# Patient Record
Sex: FEMALE | Race: BLACK OR AFRICAN AMERICAN | NOT HISPANIC OR LATINO | URBAN - NONMETROPOLITAN AREA
[De-identification: names, ages, dates, MRNs, and addresses within clinical notes are randomized per-mention and may not be internally consistent; named-entity substitution may affect disease eponyms.]

---

## 2020-07-24 ENCOUNTER — IMPORTED ENCOUNTER (OUTPATIENT)
Dept: URBAN - NONMETROPOLITAN AREA CLINIC 1 | Facility: CLINIC | Age: 65
End: 2020-07-24

## 2020-07-24 PROBLEM — H25.813: Noted: 2018-03-09

## 2020-07-24 PROBLEM — H10.423: Noted: 2020-07-24

## 2020-07-24 PROBLEM — H40.013: Noted: 2020-07-24

## 2020-07-24 PROBLEM — H40.1131: Noted: 2021-03-22

## 2020-07-24 PROBLEM — H52.4: Noted: 2018-03-09

## 2020-07-24 PROCEDURE — 92014 COMPRE OPH EXAM EST PT 1/>: CPT

## 2020-07-24 PROCEDURE — 92015 DETERMINE REFRACTIVE STATE: CPT

## 2020-07-24 NOTE — PATIENT DISCUSSION
Presbyopia OUDiscussed refractive status in detail with patient. New glasses Rx given today. Continue to monitor. 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 Reviewed symptoms related to cataract progression. Discussed various treatment options with patient. No treatment required at this time. Continue to monitor. Ocular Allergies OUDiscussed diagnosis in detail with patient. Papillae noted OD>OS. Start Pataday BID OU X 2 week then PRN. Continue to monitor. Glaucoma Suspect OUDiscussed diagnosis in detail with patient. Positive family history of disease (father). IOP at 10 OD and 11 OS. Cup to disc noted at 0.6 OD and 0.7 OS. Continue to monitorRTC in 6 months with VF 24-2 OCT and Pach; 's Notes: MR  7/24/20DFE  7/24/20OCTOPTOSGONIOVFPACH

## 2021-03-22 ENCOUNTER — IMPORTED ENCOUNTER (OUTPATIENT)
Dept: URBAN - NONMETROPOLITAN AREA CLINIC 1 | Facility: CLINIC | Age: 66
End: 2021-03-22

## 2021-03-22 PROCEDURE — 99214 OFFICE O/P EST MOD 30 MIN: CPT

## 2021-03-22 PROCEDURE — 92083 EXTENDED VISUAL FIELD XM: CPT

## 2021-03-22 PROCEDURE — 76514 ECHO EXAM OF EYE THICKNESS: CPT

## 2021-03-22 PROCEDURE — 92133 CPTRZD OPH DX IMG PST SGM ON: CPT

## 2021-03-22 NOTE — PATIENT DISCUSSION
POAG OUDiscussed diagnosis in detail with patient. Positive family history of disease (father). VF done today; baseline with temp defect OD and non specific defect OS. OCT done today; baseline with superior/inferior NFL thinning OD and borderline temp NFL thinning OS. Pach done today; baseline with thin corneas 507 OD and 524 OS. IOP at 14 OU. Cup to disc noted at 0.6 OD and 0.7 OS. Start Vyzulta QHS OU sample given today and Rx sent to pharmacy. Continue to monitorRTC in 3 months for follow up with OptosCombined Cataract OU Discussed diagnosis in detail with patient. Reviewed symptoms related to cataract progression. Discussed various treatment options with patient. No treatment required at this time. Continue to monitor. Ocular Allergies OUDiscussed diagnosis in detail with patient. Papillae improved today. Continue Pataday PRN. Continue to monitor.; 's Notes: MR  7/24/20DFE  7/24/20OCT   3/22/21VF  3/22/21OPTOSGONIOPACH  3/22/21

## 2021-06-29 ENCOUNTER — IMPORTED ENCOUNTER (OUTPATIENT)
Dept: URBAN - NONMETROPOLITAN AREA CLINIC 1 | Facility: CLINIC | Age: 66
End: 2021-06-29

## 2021-06-29 PROCEDURE — 92250 FUNDUS PHOTOGRAPHY W/I&R: CPT

## 2021-06-29 PROCEDURE — 99214 OFFICE O/P EST MOD 30 MIN: CPT

## 2021-06-29 NOTE — PATIENT DISCUSSION
POAG OUDiscussed diagnosis in detail with patient. Positive family history of disease (father). VF done previously; baseline with temp defect OD and non specific defect OS. OCT done previously; baseline with superior/inferior NFL thinning OD and borderline temp NFL thinning OS. Pach done previously; baseline with thin corneas 507 OD and 524 OS. Optos done today; baseline with cupping noted. IOP noted at 10 OU today last visit was 14 OU. Cup to disc noted at 0.6 OD and 0.7 OS. Shannon Cabrera was not covered by insurance. Continue Latanoprost OU QHS. Target IOP below 15. Continue to monitor closelyRTC in 3 months for follow up with GonioCombined Cataract OU Discussed diagnosis in detail with patient. Reviewed symptoms related to cataract progression. Discussed various treatment options with patient. No treatment required at this time. Continue to monitor. Ocular Allergies OUDiscussed diagnosis in detail with patient. Papillae improved today. Continue Pataday PRN. Continue to monitor.; 's Notes: MR  7/24/20DFE  7/24/20OCT   3/22/21VF  3/22/21OPTOS  6/29/2021GONIOPACH  3/22/21

## 2021-10-01 ENCOUNTER — IMPORTED ENCOUNTER (OUTPATIENT)
Dept: URBAN - NONMETROPOLITAN AREA CLINIC 1 | Facility: CLINIC | Age: 66
End: 2021-10-01

## 2021-10-01 ENCOUNTER — PREPPED CHART (OUTPATIENT)
Dept: URBAN - NONMETROPOLITAN AREA CLINIC 1 | Facility: CLINIC | Age: 66
End: 2021-10-01

## 2021-10-01 PROCEDURE — 92020 GONIOSCOPY: CPT

## 2021-10-01 PROCEDURE — 99214 OFFICE O/P EST MOD 30 MIN: CPT

## 2021-10-01 NOTE — PATIENT DISCUSSION
POAG OUDiscussed diagnosis in detail with patient. Positive family history of disease (father). Discussed the chronic progressive nature of this disease and various treatment options. Importance of good compliance with medications was emphasized. Gonio done today; baseline; Grade IV with light pigment OU. VF done previously; baseline with temp defect OD and non specific defect OS. OCT done previously; baseline with superior/inferior NFL thinning OD and borderline temp NFL thinning OS. Pach done previously; baseline with thin corneas 507 OD and 524 OS. Optos done previously; baseline with cupping noted. IOP noted at 09 OD and 10 OS. Cup to disc noted at 0.6 OD and 0.7 OS. Ancil Devries was not covered by insurance. Continue Latanoprost OU QHS Rx sent to pharmacy. Target IOP below 15. Continue to monitor closelyRTC in 3 months for follow up with OCTCombined Cataract OU Discussed diagnosis in detail with patient. Reviewed symptoms related to cataract progression. Discussed various treatment options with patient. No treatment required at this time. Continue to monitor. Ocular Allergies OUDiscussed diagnosis in detail with patient. Papillae improved today. Continue Pataday PRN. Continue to monitor.; 's Notes: MR  7/24/20DFE  7/24/20OCT   3/22/21VF  3/22/21OPTOS  6/29/2021GONIO  10/1/21PACH  3/22/21

## 2022-03-04 ASSESSMENT — TONOMETRY
OD_IOP_MMHG: 09
OS_IOP_MMHG: 10

## 2022-03-04 ASSESSMENT — VISUAL ACUITY
OS_CC: 20/40+1
OS_PH: 20/30-1
OD_CC: 20/30+1

## 2022-03-07 ENCOUNTER — FOLLOW UP (OUTPATIENT)
Dept: URBAN - NONMETROPOLITAN AREA CLINIC 1 | Facility: CLINIC | Age: 67
End: 2022-03-07

## 2022-03-07 DIAGNOSIS — H40.1131: ICD-10-CM

## 2022-03-07 PROCEDURE — 99214 OFFICE O/P EST MOD 30 MIN: CPT

## 2022-03-07 PROCEDURE — 92133 CPTRZD OPH DX IMG PST SGM ON: CPT

## 2022-03-07 ASSESSMENT — VISUAL ACUITY
OS_CC: 20/30
OD_CC: 20/30-2

## 2022-03-07 ASSESSMENT — TONOMETRY
OD_IOP_MMHG: 10
OS_IOP_MMHG: 10

## 2022-03-07 NOTE — PATIENT DISCUSSION
Discussed findings w/pt today. Signs/symptoms associated discussed. Recommend Alaway, Zaditor, or Pataday OU QD-BID PRN.   Continue to monitor PRN recurrence.

## 2022-03-07 NOTE — PATIENT DISCUSSION
Discussed diagnosis in detail with patient. Appears stable on exam today. Continue current treatment regimen. Continue to monitor every few months with testing as directed.

## 2022-04-15 ASSESSMENT — TONOMETRY
OD_IOP_MMHG: 10
OS_IOP_MMHG: 10
OD_IOP_MMHG: 10
OS_IOP_MMHG: 10
OD_IOP_MMHG: 09
OS_IOP_MMHG: 11
OS_IOP_MMHG: 14
OD_IOP_MMHG: 14

## 2022-04-15 ASSESSMENT — VISUAL ACUITY
OS_SC: 20/25
OS_CC: 20/25
OS_SC: 20/40-2
OD_CC: 20/25
OD_SC: 20/25-2
OD_SC: 20/30+
OS_PH: 20/30-1
OS_SC: 20/40+
OS_PH: 20/30-
OD_SC: 20/25-

## 2022-04-15 ASSESSMENT — PACHYMETRY
OD_CT_UM: 507; ADJ: VTHIN
OS_CT_UM: 524; ADJ: THIN
OD_CT_UM: 507; ADJ: VTHIN
OS_CT_UM: 524; ADJ: THIN

## 2022-06-10 ENCOUNTER — COMPREHENSIVE EXAM (OUTPATIENT)
Dept: URBAN - NONMETROPOLITAN AREA CLINIC 1 | Facility: CLINIC | Age: 67
End: 2022-06-10

## 2022-06-10 DIAGNOSIS — H52.4: ICD-10-CM

## 2022-06-10 PROCEDURE — 92014 COMPRE OPH EXAM EST PT 1/>: CPT

## 2022-06-10 PROCEDURE — 92015 DETERMINE REFRACTIVE STATE: CPT

## 2022-06-10 ASSESSMENT — VISUAL ACUITY
OD_CC: 20/30-1
OS_CC: 20/30-1

## 2022-06-10 ASSESSMENT — TONOMETRY
OS_IOP_MMHG: 08
OD_IOP_MMHG: 08

## 2022-12-22 ENCOUNTER — FOLLOW UP (OUTPATIENT)
Dept: URBAN - NONMETROPOLITAN AREA CLINIC 1 | Facility: CLINIC | Age: 67
End: 2022-12-22

## 2022-12-22 PROCEDURE — 99214 OFFICE O/P EST MOD 30 MIN: CPT

## 2022-12-22 PROCEDURE — 92133 CPTRZD OPH DX IMG PST SGM ON: CPT

## 2022-12-22 ASSESSMENT — TONOMETRY
OD_IOP_MMHG: 11
OS_IOP_MMHG: 11

## 2022-12-22 ASSESSMENT — VISUAL ACUITY
OS_CC: 20/30
OD_CC: 20/30

## 2023-06-22 ENCOUNTER — ESTABLISHED PATIENT (OUTPATIENT)
Dept: URBAN - NONMETROPOLITAN AREA CLINIC 1 | Facility: CLINIC | Age: 68
End: 2023-06-22

## 2023-06-22 DIAGNOSIS — H52.4: ICD-10-CM

## 2023-06-22 PROCEDURE — 92015 DETERMINE REFRACTIVE STATE: CPT

## 2023-06-22 PROCEDURE — 92014 COMPRE OPH EXAM EST PT 1/>: CPT

## 2023-06-22 ASSESSMENT — TONOMETRY
OD_IOP_MMHG: 11
OS_IOP_MMHG: 11

## 2023-06-22 ASSESSMENT — VISUAL ACUITY
OD_CC: 20/40
OD_PH: 20/30+2
OU_CC: 20/30+2
OS_CC: 20/30

## 2023-12-18 ENCOUNTER — ESTABLISHED PATIENT (OUTPATIENT)
Dept: URBAN - NONMETROPOLITAN AREA CLINIC 1 | Facility: CLINIC | Age: 68
End: 2023-12-18

## 2023-12-18 DIAGNOSIS — H25.813: ICD-10-CM

## 2023-12-18 DIAGNOSIS — H35.52: ICD-10-CM

## 2023-12-18 DIAGNOSIS — H40.1131: ICD-10-CM

## 2023-12-18 PROCEDURE — 92133 CPTRZD OPH DX IMG PST SGM ON: CPT

## 2023-12-18 PROCEDURE — 99214 OFFICE O/P EST MOD 30 MIN: CPT

## 2023-12-18 ASSESSMENT — VISUAL ACUITY
OS_CC: 20/40
OD_CC: 20/50
OD_PH: 20/50

## 2023-12-18 ASSESSMENT — TONOMETRY
OD_IOP_MMHG: 11
OS_IOP_MMHG: 12

## 2024-07-12 ENCOUNTER — COMPREHENSIVE EXAM (OUTPATIENT)
Dept: URBAN - NONMETROPOLITAN AREA CLINIC 1 | Facility: CLINIC | Age: 69
End: 2024-07-12

## 2024-07-12 DIAGNOSIS — H52.4: ICD-10-CM

## 2024-07-12 PROCEDURE — 92014 COMPRE OPH EXAM EST PT 1/>: CPT

## 2024-07-12 PROCEDURE — 92015 DETERMINE REFRACTIVE STATE: CPT

## 2024-07-12 ASSESSMENT — TONOMETRY
OS_IOP_MMHG: 14
OD_IOP_MMHG: 14

## 2024-07-12 ASSESSMENT — VISUAL ACUITY
OD_CC: 20/22
OS_CC: 20/22

## 2025-01-24 ENCOUNTER — FOLLOW UP (OUTPATIENT)
Age: 70
End: 2025-01-24

## 2025-01-24 DIAGNOSIS — H35.52: ICD-10-CM

## 2025-01-24 DIAGNOSIS — H25.813: ICD-10-CM

## 2025-01-24 DIAGNOSIS — H40.1131: ICD-10-CM

## 2025-01-24 DIAGNOSIS — H10.423: ICD-10-CM

## 2025-01-24 PROCEDURE — 92133 CPTRZD OPH DX IMG PST SGM ON: CPT

## 2025-01-24 PROCEDURE — 99214 OFFICE O/P EST MOD 30 MIN: CPT

## 2025-01-24 PROCEDURE — 92083 EXTENDED VISUAL FIELD XM: CPT

## 2025-07-25 ENCOUNTER — COMPREHENSIVE EXAM (OUTPATIENT)
Age: 70
End: 2025-07-25

## 2025-07-25 DIAGNOSIS — H40.1131: ICD-10-CM

## 2025-07-25 DIAGNOSIS — H25.813: ICD-10-CM

## 2025-07-25 DIAGNOSIS — H52.4: ICD-10-CM

## 2025-07-25 DIAGNOSIS — H35.52: ICD-10-CM

## 2025-07-25 DIAGNOSIS — H10.423: ICD-10-CM

## 2025-07-25 PROCEDURE — 92250 FUNDUS PHOTOGRAPHY W/I&R: CPT

## 2025-07-25 PROCEDURE — 92014 COMPRE OPH EXAM EST PT 1/>: CPT

## 2025-07-25 PROCEDURE — 92015 DETERMINE REFRACTIVE STATE: CPT
